# Patient Record
Sex: FEMALE | Race: WHITE | URBAN - METROPOLITAN AREA
[De-identification: names, ages, dates, MRNs, and addresses within clinical notes are randomized per-mention and may not be internally consistent; named-entity substitution may affect disease eponyms.]

---

## 2023-01-13 ENCOUNTER — OFFICE VISIT (OUTPATIENT)
Dept: FAMILY MEDICINE CLINIC | Facility: CLINIC | Age: 28
End: 2023-01-13

## 2023-01-13 VITALS
WEIGHT: 131.81 LBS | DIASTOLIC BLOOD PRESSURE: 71 MMHG | BODY MASS INDEX: 19.98 KG/M2 | HEART RATE: 90 BPM | HEIGHT: 68.2 IN | SYSTOLIC BLOOD PRESSURE: 112 MMHG

## 2023-01-13 DIAGNOSIS — D22.9 CHANGE IN SKIN MOLE: Primary | ICD-10-CM

## 2023-01-13 PROCEDURE — 3008F BODY MASS INDEX DOCD: CPT | Performed by: FAMILY MEDICINE

## 2023-01-13 PROCEDURE — 3078F DIAST BP <80 MM HG: CPT | Performed by: FAMILY MEDICINE

## 2023-01-13 PROCEDURE — 3074F SYST BP LT 130 MM HG: CPT | Performed by: FAMILY MEDICINE

## 2023-01-13 PROCEDURE — 99203 OFFICE O/P NEW LOW 30 MIN: CPT | Performed by: FAMILY MEDICINE

## 2025-02-20 NOTE — PROGRESS NOTES
[facility]  Obstetrics and Gynecology  History & Physical    CC: Patient is a new patient and here for a well woman exam     Subjective:     HPI: Hannah Wu is a 29 year old  female here for a well women exam. Patient reports no issues, currently on OCPs, but will start trying for second pregnancy after completion of last pill pack this month. Advised to start PNV when actively trying to conceive. Pt also desires Rx for iron supplementation as she was anemic with last pregnancy and has been feeling increased fatigue for past 1 year.     OB History:  OB History    Para Term  AB Living   1 1 1 0 0 1   SAB IAB Ectopic Multiple Live Births   0 0 0 0 1       Gyne History:  Menarche: [age]  Period Cycle (Days): 28-30 days  Period Duration (Days): 4-5 days  Period Flow: moderate  Use of Birth Control (if yes, specify type): OCP  Hx Prior Abnormal Pap: No  Pap Result Notes: Pt believes last pap was in  - wnl  Patient's last menstrual period was 2025 (exact date).      no history of STDs (non-HPV).   Sexual history: Active? Yes,   Birth control? OCPs    Meds:  Medications Ordered Prior to Encounter[1]    All:  Allergies[2]    PMH:  Past Medical History:    Scoliosis       Immunization History:   Immunization History   Administered Date(s) Administered    Covid-19 Vaccine Pfizer 30 mcg/0.3 ml 2022    Influenza 10/26/2021    Influenza Vaccine, trivalent (IIV3), 0.5mL IM 2022    TDAP 2020       PSH:  Past Surgical History:   Procedure Laterality Date      2024    [place] teeth removed         Social History:  Social History     Socioeconomic History    Marital status:      Spouse name: Not on file    Number of children: Not on file    Years of education: Not on file    Highest education level: Not on file   Occupational History    Not on file   Tobacco Use    Smoking status: Never    Smokeless tobacco: Never   Vaping Use     Vaping status: Never Used   Substance and Sexual Activity    Alcohol use: Yes     Comment: socially    Drug use: Never    Sexual activity: Yes     Partners: Male     Birth control/protection: OCP   Other Topics Concern     Service Not Asked    Blood Transfusions Not Asked    Caffeine Concern Yes    Occupational Exposure Not Asked    Hobby Hazards Not Asked    Sleep Concern Not Asked    Stress Concern Not Asked    Weight Concern Not Asked    Special Diet Not Asked    Back Care Not Asked    Exercise Yes     Comment: walking    Bike Helmet Not Asked    Seat Belt Yes    Self-Exams Not Asked   Social History Narrative    Not on file     Social Drivers of Health     Food Insecurity: Low Risk  (1/7/2024)    Received from [facility]    Food Insecurity     Have there been times that your food ran out, and you didn't have money to get more?: No     Are there times that you worry that this might happen?: No   Transportation Needs: Low Risk  (1/7/2024)    Received from [facility]    Transportation Needs     Do you have trouble getting transportation to medical appointments?: No     How do you normally get to and from your appointments?: Not on file   Stress: Not on file   Housing Stability: Not on file (1/7/2024)         Patient feels unsafe or threatened?: no    Abuse: no physical, sexual or mental.     Family History:  Family History   Problem Relation Age of Onset    No Known Problems Mother     Lipids Father     Skin cancer Father         no chemo/radiation    No Known Problems Maternal Grandmother     Colon Cancer Maternal Grandfather     Other (lung cancer) Paternal Grandmother     Pancreatic Cancer Paternal Grandfather     Breast Cancer Paternal Aunt 40    Uterine Cancer Neg     Ovarian Cancer Neg        Health maintenance:  Mammogram (age 40 and q1-2yr): n/a  Colonoscopy (age 45 and q10yr): n/a    Review of Systems:  General:  denies fevers, chills, fatigue and  malaise  Breast:  denies rashes, skin changes, pain, lumps or discharge   Respiratory:  denies SOB, dyspnea, cough or wheezing  Cardiovascular:  denies chest pain, palpitations  GI: denies abdominal pain, diarrhea, constipation  :  denies dysuria, hematuria, increased urinary frequency.   Heme:  denies history of excessive bleeding or bruising      Objective:     Vitals:    25 1258   BP: 114/80   Pulse: 76   Weight: 141 lb 4 oz (64.1 kg)   Height: 68\"         Body mass index is 21.48 kg/m².    General: AAO.NAD.   CVS exam: normal peripheral perfusion  Chest: non-labored breathing, no tachypnea   Breast:  symmetric, no dominant or suspicious mass, no skin or nipple changes and no axillary adenopathy  Abdominal exam:  soft, nontender, nondistended  Pelvic exam:   VULVA: normal appearing vulva with no masses, tenderness or lesions  PERINEUM:  normal appearing, no lesions   URETHRAL MEATUS:  normal appearing, no lesions   VAGINA: normal appearing vagina with normal color and discharge, no lesions  CERVIX: normal appearing cervix without discharge or lesions  UTERUS: uterus is normal size, shape, consistency and nontender  ADNEXA: normal adnexa in size, nontender and no masses  PERIRECTAL:  normal appearing, no lesions   Ext: non-tender, no edema    Assessment:     Hannah Wu is a 29 year old  female here for a well women exam.     Patient Active Problem List   Diagnosis   (none) - all problems resolved or deleted         Plan:     Problem List Items Addressed This Visit    None  Visit Diagnoses       Well woman exam with routine gynecological exam    -  Primary    Relevant Medications    SRONYX 0.1-20 MG-MCG Oral Tab    Ferrous Sulfate 325 (65 Fe) MG Oral Tab    Cervical cancer screening        Relevant Medications    SRONYX 0.1-20 MG-MCG Oral Tab    Ferrous Sulfate 325 (65 Fe) MG Oral Tab    Other Relevant Orders    ThinPrep PAP Smear    Hpv Dna  High Risk , Thin Prep Collect               Cervical cancer screening  - discussion held with the patient about ASCCP guidelines  - repeat pap smear collected today; pt declined STI testing  Health maintenance  - encouraged to maintain weight at healthy BMI  - discussed importance of exercise and healthy eating  - self breast exam instructions provided  Anemia history  - Rx for iron supplement sent to patient's pharmacy      All of the findings and plan were discussed with the patient.  She notes understanding and agrees with the plan of care.  All questions were answered to the best of my ability at this time.      RTC in 1 year for a well woman exam or sooner if needed     Sil Almaraz,   [facility] - OBGYN      Discussed with patient that there will not be further notification of normal or benign results other than receiving results on Lexara. A Lexara message or telephone call will be placed by the physician and/or office staff if results are abnormal.     Note to patient and family   The 21st Century Cures Act makes medical notes available to patients in the interest of transparency.  However, please be advised that this is a medical document.  It is intended as [id number] communication.  It is written and medical language may contain abbreviations or verbiage that are technical and unfamiliar.  It may appear blunt or direct.  Medical documents are intended to carry relevant information, facts as evident, and the clinical opinion of the practitioner.        This note could include assistance by Dragon voice recognition. Errors in content may be related to improper recognition by the system; efforts to review and correct have been done but errors may still exist.        [1]   Current Outpatient Medications on File Prior to Visit   Medication Sig Dispense Refill    SRONYX 0.1-20 MG-MCG Oral Tab Take 1 tablet by mouth daily.      Multiple Vitamin (MULTIVITAMIN ADULT OR) Take by mouth.       No current facility-administered medications on file  prior to visit.   [2] No Known Allergies

## 2025-05-30 NOTE — TELEPHONE ENCOUNTER
Patient calling to initiate prenatal care  LMP 5/3  Patient is 7-8 weeks on 6/21-  Confirmation of pregnancy appointment scheduled on   Future Appointments   Date Time Provider Department Center   6/25/2025 12:15 PM [facility] [address]   6/25/2025  1:00 PM Steven Knox MD [facility] [address]   7/24/2025  2:30 PM Lynda Turner APN [facility] [address]   8/20/2025  1:00 PM Halley Mesa MD [facility] [address]       Insurance [employer]    Any history of ectopic pregnancy? NO  Any history of miscarriage? NO   Any medications that you are taking on a regular basis other than prenatal vitamins? No  (if not taking prenatal vitamins, encourage patient to start taking.)  Any bleeding since the first day of last LMP and your positive pregnancy test? No

## 2025-06-25 NOTE — PATIENT INSTRUCTIONS
Medications Safe in Pregnancy    The following over-the-counter medications may be taken safely after 12 weeks gestation by any patient who is pregnant.  Please follow the instructions on the package for adult dosage.  If you experience any symptoms prior to 12 weeks, please call the office at [phone].  (**these are all safe for you to use now. We ask that you use them sparingly in the first trimester and that you call us with any persistent concerns**)     Colds/Congestion: Flonase, Saline Nasal Spray, Neti Pot, Plain Robitussin, Robitussin DM, Mucinex DM, Vicks 44 E, Vicks Vapor rub, Cough drops without Zinc or Sudafed.   Contact your doctor if you have a persistent fever over 100.4, shortness of breath, coughing up green mucus, or a sore throat that persists from more than 3 days     Diarrhea: Imodium, Maalox Anti-Diarrheal or Kaopectate  Contact the office if you have diarrhea for more than 3 days.     Allergies: Benadryl, Claritin or Zyrtec     Hemorrhoids: Tucks pads, Preparation H, Annusol, Sitz bath or Witch hazel  Eat a high fiber diet and drink plenty of fluids.     Yeast: Monistat 3 or 7  Call if your symptoms do not improve, or if you experience vaginal bleeding, or a watery discharge.     Constipation: Metamucil, Colace, fiber supplement, Milk of Magnesia or Dulcolax  Drink plenty of fluids, eat high-fiber foods and take the above laxatives sporadically.      Headache or Mild aches and pain: Extra Strength Tylenol      Gas: Gas X, Gelusil, Papaya Tablets, Maalox, Mylicon or Mylanta Gas     Heartburn: Tums, Mylanta, Pepcid AC or Maalox     Sore throat: Alcohol free Chloraseptic spray or Lozenges      Nausea and Vomiting: ½ tablet Unisom plus 100mg of Vitamin B6 at bedtime (may increase B6 up to 200mg per day), Joy root tea or raspberry leaf tea     Insomnia: Tylenol PM, Vitamin B6 50mg, warm bath or milk, Unisom, Nytol or Sominex.      We have listed a few medications for common symptoms seen  in pregnancy.  Please contact the office if you are unsure about any over the counter medications that are not listed above.       Nausea and vomiting management in pregnancy     1. Half tablet of Unisom and 100 mg of Vitamin B6 at bedtime. Can repeat Vitamin B6 x1 in the morning or during the day  2. Small, frequent meals/snacks- especially important to include good proteins in your evening meal if you are feeling nauseated in the morning. This will keep your stomach full longer and reduce nausea.  3. Ginger tea, ginger ale, ginger chews, peppermint candies, hard candies  4. Keep a snack and or drink at your bedside to have before you even get out of bed in the morning  5. Sea Bands ([email or web address]/) these can be worn all day/night as needed. The correct spot to wear them is 3 finger widths down from where your wrist bends on the inside     **if you find yourself unable to keep anything down for 24 hours or more, please go to one of the [facility] or the ER so that you can get IV fluids and medications**  Healthy Eating Habits During Pregnancy  It’s important to develop healthy eating habits while you are pregnant, for you as well as for your baby. Here are some ways to stay healthy.   Aim for a healthy weight  A slow, steady rate of weight gain is often best. After the first trimester, you may gain about a pound a week. If you were overweight before pregnancy, you need to gain fewer pounds. Your healthcare provider can give you a healthy weight goal for your pregnancy.   Don’t diet  Now is not the time to diet. You may not get enough of the nutrients you and your baby need. Instead, learn how to be a healthy eater. Start by doing it for your baby. Soon, you may do it for yourself.   Vitamins and supplements  Talk with your healthcare provider about taking these and other prenatal vitamins and supplements.   Iron makes the extra blood you need now.  Calcium and vitamin D help build and  keep strong bones.  Folic acid helps prevent certain birth defects.  Iodine helps the thyroid work right.  Some vitamins may not be safe to take. Your healthcare provider will tell you which ones to avoid.  Fluids  Drink at least 8 to 10 cups of fluid daily. Your baby needs fluids. Fluids also decrease constipation, flush out toxins and waste, limit swelling, and help prevent bladder infections. Water is best. Other good choices are:   Water or seltzer water with a slice of lemon or lime. (These can also help ease an upset stomach.)  Clear soups that are low in salt  Low-fat or fat-free milk, soy or rice milk with calcium added  Popsicles or gelatin  Things to avoid  Some things might harm your growing baby. Don’t eat or drink:   Alcohol  Unpasteurized dairy foods and juices  Raw or undercooked meat, poultry, fish, or eggs  Unwashed fruits and vegetables  Prepared meats, like deli meats or hot dogs, unless heated until steaming hot  Fish that are high in mercury, like shark, swordfish, vikas mackerel, tilefish, and albacore tuna  Things to limit  Ask your healthcare provider whether it’s safe to eat or drink:   Caffeine  Artificial sweeteners  Organ meats  Certain types of fish  Fish and shellfish that contain mercury in lower amounts, like shrimp, canned light tuna, salmon, pollock, and catfish      Foods to Avoid During Pregnancy  Deli Meats- You may eat deli meats from the deli.  If you eat deli meats in the package, it may be contaminated with Listeria. Heat all deli meats in a package to 165 degrees Fahrenheit before eating, even if the label states the meat is “pre-cooked”.    Soft Cheeses and Unpasteurized Products - You may eat soft cheeses that are pasteurized.  Please avoid all soft cheeses, milk or juice that is unpasteurized.  Fish- avoid fish that contains a high level of mercury. These include but are not limited to; [place], Orange Roughy, Tile Fish, Shark, Swordfish, and Vikas Mackerel. Please see chart  below for good fish choices in pregnancy. Also avoid any raw, uncooked shellfish such as oysters, clams, or sushi.  Make sure to cook all meats; including ground beef, pork, and poultry to their desired temperatures before consuming. This reduces the chance of a food borne illness.  Caffeine- limit to 200 mg or an 8oz cup daily or less.   Alcohol- no amount of alcohol is determined to be safe in pregnancy. Do not drink any alcoholic beverages while pregnant.            [facility]- Prenatal Testing    The following information is designed to help you understand some of the optional tests that are available to you to screen for problems in your pregnancy.  Before considering any of these tests, you will need to consider the following questions:    [1] What would you do if an abnormality were detected?  If the answer is nothing, then you may decide to decline all testing.  [2] Would you be willing to undergo testing which might increase your risk of miscarriage, such as an amniocentesis or CVS (see below)?  [3] If you have the initial testing, and it indicates that there might be a problem, and you subsequently decide not to do invasive testing such as an amniocentesis, would you worry excessively through the remainder of the pregnancy?    The following tests are available to screen for problems in your pregnancy:    [1]  First Trimester Screening (also called Nuchal Thickness, Nuchal Translucency or NT)- This is an abdominal ultrasound done between 10 and 14 weeks by a perinatology specialist (Maternal Fetal Medicine, MFM) which measures the thickness of the skin behind your baby's neck.  Increased thickness of the skin in this area has been linked to an increased risk of genetic abnormalities like Down Syndrome.  A second visit may be required if the baby is not in the correct position.  You will need to schedule an appointment with the Maternal Fetal Medicine office.  Address and phone number  below:  Please verify insurance coverage:  CPT code: 72533 (ydoer) or 87268 (twins)  Diagnosis: Genetic screening- Z36.8A  Maternal Fetal Medicine  Dr. Truong, Dr. Ramirez, Dr. Reis, and Dr. Mccray  [address] [place] [address] [address] [place] [address]  Phone [phone]  Fax [phone]    [2] Cell-Free DNA testing (NIPT)- This is a blood test done any time after 10-11 weeks which screens for genetic abnormalities like Down Syndrome.  It is greater than 98% sensitive but requires an amniocentesis for confirmation if abnormal.  It can also tell you the sex of the baby.  Please see the included pamphlet for more information about cost and billing options.  CPT code: 64074  Diagnosis code: Genetic screening- 36.8A    [3] Quad Screen (also called AFP-Plus or Tetra Screen)- This is a blood test done which screens for both genetic abnormalities like Down Syndrome and neural tube defects (NTD's), such as spina bifida (an abnormal opening in the spine which can cause paralysis).  It is usually performed around 16-20 weeks.  If the Quad screen comes back abnormal, it does not mean that your baby definitely has an abnormality.  It only means that there is an increased risk of an abnormality.  Additional testing such as a Level II ultrasound or amniocentesis may be recommended (see below).  This test is less accurate at picking up genetic abnormalities than the cell-free DNA test.  If you have test #1 or 2, then usually only the AFP part of the Quad screen would be performed to screen for NTD's (see below).    [4]  Alpha Fetoprotein (AFP, MSAFP)- This is a simple blood test that screens for neural tube defects such as spina bifida (an abnormal opening in the spine).  It is usually performed around 16-20 weeks.  Additional testing such as a Level II ultrasound may be recommended for an abnormal test result.  Please verify insurance coverage:  CPT code: 78900 Diagnosis code: Genetic Screening- Z36.8A    [5] Cystic Fibrosis/SMA  Carrier/Fragile X Carrier Screening- Cystic Fibrosis is a genetic disease which causes lung problems in the infant.  SMA (spinal muscular atrophy) is a genetic disease that affects the nerve cells of the spinal cord.  Fragile X is the most common cause of mental disabilities.  These genetic defects would need to be passed from both parents to the child.  A blood test is performed on the mother, and if her test is positive, then the father should also be tested. These tests can be done at any time in the pregnancy, usually in the first trimester with your initial OB labs.  All babies are screened for cystic fibrosis after birth.  Please verify insurance coverage:  Cystic Fibrosis CPT Code: 62592 Diagnosis code: Cystic Fibrosis screening- Z13.228  SMA CPT Code: 49877 Diagnosis code: Genetic Screening- Z36.8A or Testing for Genetic Disease Carrier- Z13.71    [6]  Level II Ultrasound-  This is an abdominal ultrasound done at approximately 20-21 weeks by a perinatology specialist (DEBBIE) at [facility] which looks at many of the baby's internal structures.  Abnormalities in certain structures have been associated with an increased risk of genetic abnormalities.  It also screens for NTD's.  This ultrasound would replace the Level I Ultrasound that we normally perform at our office around the same time.    [7]  Amniocentesis-  During this procedure, a needle is passed through your abdominal wall to withdrawal some amniotic fluid from around the baby.  It screens for both genetic abnormalities and NTD's and is usually performed around 15-16 weeks.  This test has the highest accuracy for detecting genetic abnormalities, but there may be a small risk of miscarriage or other complications.  A similar procedure called Chorionic Villus Sampling (CVS) is performed by passing a catheter through the vagina to remove a small sample of tissue from the placenta (afterbirth).  CVS may have a higher risk of miscarriage and other rare  complications compared to amniocentesis but can be performed earlier in pregnancy.  Amniocentesis is often recommended/offered if any of the previously mentioned tests come back abnormal.  A pamphlet is available if you would like more information about this option.  If you decide to have an amniocentesis, the other tests would be unnecessary.      The above information covers some of the basics.  Pamphlets on the Cell-Free DNA test, Quad Screen and Cystic Fibrosis test should be in your prenatal packet.  Your doctor will discuss this information in more detail, and feel free to ask additional questions.  Also, remember that all of these tests are optional, and will only be performed at your request.  Testing that needs to be done through the perinatologist's office may require 2-3 weeks lead time to schedule.              PRENATAL INSTRUCTIONS    These prenatal instructions are specific to the care of our patients and supplement the books and pamphlets you are receiving.  The physicians and office staff are also available to answer questions not covered in this material.  We function as a group practice with physicians alternating call for deliveries.  For this reason you should see each of the doctors at least once during your pregnancy.  We provide continuous coverage of our patients and are committed to competent and concerned patient care.    PRENATAL CARE  Because of the importance of good prenatal care, you will have monthly office visits until the 28th week of pregnancy, then every two weeks for the next eight weeks and weekly during the final month.  Certain conditions may require more frequent visits.    MEDICATION  You will be given a prescription for prenatal vitamins with DHA to be taken until your six week postpartum visit or until you stop breastfeeding, whichever lasts longer.  These vitamins can occasionally exacerbate stomach irritation or constipation;  please contact us if you are having  difficulties tolerating your vitamin.  Should you become anemic during your pregnancy, you may receive a supplemental iron tablet to correct the condition.  Unnecessary medication should be avoided, but you may take Tylenol (Acetaminophen, regular or extra-strength) for ordinary headaches and Tums, Mylanta or Maalox for heartburn.  For cough or cold symptoms you may try Contac, Benadryl, Tylenol Sinus, Chlor-Trimeton, Actifed, Sudafed or plain Robitussin.  For constipation drink 6 to 8 glasses of water each day and increase your roughage intake with foods such as lettuce, bran, wheat bread and green vegetables.  You may take Metamucil, Colace or Senekot if the above do not work.  Please call the office to confirm the safety of any other prescription, over-the-counter or herbal medications.\Alcohol intake and tobacco should be avoided during pregnancy.  Caffeinated beverages (coffee, tea and soft drinks) should be limited to no more than 2 cups daily.  Pregnant women should consume the following each day through diet or supplements:  o Folic acid 400-800 micrograms   o Iron 30 mg (or be screened for anemia)  o Vitamin D 600 international units  o Calcium 1,000 mg    NUTRITION/HEALTH  Gaining weight is the easiest thing for a pregnant woman to do.  A well balanced diet of lean meat, fish, poultry, non-starchy vegetables, fruit and salads should accomplish an average weight gain of 2 to 4 pounds a month.  Avoid raw or undercooked fish or meats.  If you have deli meat, please microwave it or cook on the stove until steaming.  Soft cheeses (Camembert, Blue, Roquefort, Goat), all non-pasteurized foods, and large game fish (Shark, Swordfish, Vikas Mackerel, Tilefish, [place]) should be avoided, because of concerns about mercury exposure.  Other fish including canned light tuna should be limited to 2 servings per week.  Albacore (“white”) tuna should be limited to once per week.  Avoid cigarette smoking, alcohol, drugs, and  vaping.  If you have deli meat, please microwave it or cook on the stove until steaming.  Caffeine: Low-to-moderate caffeine intake in pregnancy does not appear to be associated with any adverse outcomes. Pregnant women may have caffeine but should probably limit it to less than 300 mg/d (a typical 8-ounce cup of brewed coffee has approximately 130 mg of caffeine. An 8-ounce cup of tea or 12-ounce soda has approximately 50 mg of caffeine), but exact amounts vary based on the specific beverage or food.  Avoid hot tub use.  Hair dye is presumed safe, but there are no randomized trials.  Insect Repellants: Topical insect repellants (including DEET) can be used in pregnancy and should be used in areas with high risk for insect-borne illnesses.  Recommended weight gain goals:   Pre-pregnancy BMI     Recommended Weight Gain   Underweight < 18.5     28-40 lbs   Normal 18.5 - 24.9     25-35 lbs   Overweight 25 - 29.9     15-25 lbs   Obese 30 or greater     11-20 lbs    PHYSICAL ACTIVITY  We encourage our patients to continue physical activities and exercise during their pregnancy.  We recommend activities such as swimming, stationary biking, walking, low-impact aerobics, tennis, and golf.  Other activities such as diving, bicycle riding, water/snow-skiing, horseback riding and flying in small aircraft should be avoided.  If you choose to exercise, your heart rate should not increase to more than 140 beats per minute or 70% of your maximum heart rate.  We recommend that you exercise for no more than 30 minutes at one time.  Saunas, Jacuzzis and tanning beds should be avoided.  Sexual intercourse is permitted in the absence of bleeding,  labor or any other complications of pregnancy.    IF YOU ARE INVOLVED IN A  MINOR CAR ACCIDENT OR HAVE A MINOR FALL, please contact our doctor on call at [phone].  Your phone call will transfer to the answering service if the office is closed.    [facility]  sponsors regular childbirth classes and we strongly recommend a class for both expectant parents.  Other popular classes include Breastfeeding, Infant CPR and Infant Care.  For more information go to [email or web address]--> Services-->Pregnancy and baby-->Tour, Classes and Events.      LABOR (at term, 37 weeks or more)  Weekdays, please call the Office at [phone].  At night and on weekends/holidays, please call Labor & Delivery at [phone].  If you have any of the following symptoms: contractions 5 to 6 minutes apart for one hour, or sooner if you have a history of fast labors, broken bag of water with or without contractions (gush or persistent slow leakage of fluid), any vaginal bleeding other than a slight bloody show following an internal exam or light blood-tinged mucous discharge, you should go directly to Labor & Delivery at [facility].  The Labor & Delivery staff will contact the doctor on call and notify him or her of your labor status. AVOID HEAVY MEALS IF YOU SUSPECT THE ONSET OF LABOR     LABOR (less than 37 weeks)  Please call the office at [phone] right away if you experience contractions more frequently than every 10 to 15 minutes for more than an hour, or any unusual low back pain or pelvic pressure.  Also use this number if you need to speak to the physician about any urgent medical problems after business hours.    HOSPITAL  The delivery of your baby will take place at [facility], located at [address] [place] [address].      PEDIATRICIAN  You will need to choose a pediatrician or family practitioner to take care of your baby both while you are in the hospital and after you go home.  If the physician you choose is not on staff at [facility], you will also need to select a physician to see the baby while you are in the hospital.  Please make sure that any physician you choose accepts your insurance plan and is accepting new patients.  Feel free to ask us for a  recommendation if you are not familiar with the doctors in this area.    DELIVERY  There are three types of anesthetics available for use during labor: short acting narcotics (Stadol, Nubain) given through your IV, local anesthetics injected to numb your perineum during delivery, and the epidural injection to numb you from the waist down during labor and delivery. The above choices are individualized, and we will work with you to make your labor experience as comfortable as possible.  Communication is extremely important during this process, so we encourage you to ask questions and discuss any concerns.  We believe childbirth is a natural process, and we hope to make your experience as wonderful as possible!   NAUSEA AND VOMITING IN PREGNANCY    This pamphlet contains some useful information if you are experiencing troublesome morning (or all day) sickness.  Morning sickness is the result of high hormone levels in pregnancy.  It usually starts around 5-7 weeks and starts to improve around 10-12 weeks.  Some suggestions on how to decrease your nausea are listed below, with simplest remedies listed first.    DIETARY SUGGESTIONS:  Every patient will have particular foods or drinks that really worsen the nausea.  Trial and error is the most important rule.  We recommend frequent, small meals.  Try eating nimo or saltine crackers, especially first thing in the morning.  Many patients find that spicy foods, greasy foods and foods with a strong odor are the worst.  To minimize odors, eat foods cold if possible and use a fan or open window in the kitchen (or avoid cooking altogether!).  Try drinking liquids ice cold in a plastic cup with a lid and straw.    HERBAL REMEDIES:  Seabands/relief bands/accupressure bands (www.reliefband.com)  Joy 250 mg three times daily  Vitamin B6 10-25 mg three to four times daily, maximum 200 mg daily    OVER-THE-COUNTER MEDICATIONS (use only one of these at a time):  Dramamine  mg  up to four times daily  Unisom 12.5 mg up to four times daily  Benadryl 25 mg three to four times daily (may cause fatigue)    PRENATAL VITAMINS:  Prenatal vitamins can worsen nausea, primarily because they contain iron.  If you find that the prenatal vitamin is making things worse, there are several things you can try.  First, ask us for a different brand of vitamin.  Patients who have problems with one brand of vitamin often find that a different brand works better.  Second, try taking the vitamin at a different time of day, or splitting it in half and taking half in the morning and half at night.  Third, there is a \"mini-vitamin\" called Premesis that contains only folate, calcium and vitamin B6, which you can take until the nausea subsides.  You can also try taking two Flintstones vitamins daily if all else fails.    If you are unable to keep anything down, including liquids, for more than 24-36 hours, please contact our office.  Keep in mind that the baby's caloric requirements early in pregnancy are fairly small, and babies do a good job of getting all the nutrients they need from your body stores.    Finally, if none of the above suggestions help or you are losing significant amounts of weight (usually more than 7-10 pounds), there are several prescription medications that may also help.  Feel free to call us if you think this may be necessary.    As a final bit of reassurance, some studies have shown that pregnancies with severe morning sickness often have a better than usual outcome... Probably because a healthy pregnancy produces lots of hormones... And thus lots of nausea!    GOOD LUCK!

## 2025-06-25 NOTE — PROGRESS NOTES
Subjective:  Patient presents complaining of no menses. Positive home pregnancy test.  LMP 5/3/25.  Previous pregnancy uncomplicated  section for arrest of dilation.  No hypertension,  labor or diabetes.  No family history of any inheritable diseases or congenital birth defects on either side of family.    Objective:  /60   Pulse 75   Ht 68\"   Wt 140 lb 6 oz (63.7 kg)   LMP 2025 (Approximate)     Physical Examination:  General appearance: Well dressed, well nourished in no apparent distress  Neurologic/Psychiatric: Alert and oriented to person, place and time, mood normal, affect appropriate    Summary of Ultrasound Findings:  Ultrasound scan performed transvaginally.  LMP 5/3/25  7w4day by LMP;  7w0day by ultrasound  Viable intrauterine pregnancy  Dates consistent with ultrasound.  Final EDC:  25 by LMP consistent with ultrasound on 2025  Normal ovaries bilaterally.  Left ovary with simple cyst.     Assessment/Plan:  Amenorrhea- Normal dating ultrasound  Previous CS- Plan RCS 39 wks.  Likely will decline BS  Follow up 3-4 weeks for new OB visit.  Prenatal vitamin with 0.4 mg folate, DHA.  Optional prenatal screening tests reviewed including cfdna, carrier screen/CF, NT/first trimester screen, Quad/AFP, Level 2 ultrasound, amniocentesis/CVS.  Bleeding precautions provided.    Diagnoses and all orders for this visit:    Amenorrhea  -     US PELVIS, ABDOMINAL GYNE EMG ONLY    Other orders  -     OB/GYNE ULTRASOUND SCAN  -     OB/GYNE ULTRASOUND REPORT  -     OB/GYNE ULTRASOUND REPORT         Return for New OB Visit.

## 2025-07-24 NOTE — PROGRESS NOTES
Here for initial prenatal visit with our group.  29 year old  at 11w5d by LMP    Patient's last menstrual period was 2025 (approximate)..     Last pap smear was in  and it was normal.     OB History    Para Term  AB Living   2 1 1   1   SAB IAB Ectopic Multiple Live Births       1      # Outcome Date GA Lbr [place]/2nd Weight Sex Type Anes PTL Lv   2 Current            1 Term 24 39w3d  8 lb 11.3 oz (3.95 kg) M CS-LTranv EPI N LISA       ROS:  Reviewed, all wnl    Please see prenatal physical exam tab for initial OB physical exam  US: please see US tab    Prenatal course and care discussed with patient: visits, labs, HIV, AFP, sonograms, GL, GBS, restrictions.  Pamphlets given. Genetic counseling done at prior visit regarding Cystic fibrosis and SMA carrier screen, First trimester screen/NT screen, CVS, QS + midtrimester sonogram for markers, amniocentesis. Pt desires NIPS.    A/P:  1. Supervision of other normal pregnancy, antepartum (HCC)  - Type and screen; Future  - CBC W Differential W Platelet  - Hepatitis B Surface Antigen  - T Pallidum Screening [place]  - Rubella, IGG  - HIV AG AB COMBO  - Urine Culture, Routine  - HCV Antibody    2. Encounter for  screening for other genetic defect (HCC)  - Kailyn Handy NIPS; Future    CRISTHIAN 4 weeks/ prn

## 2025-07-25 NOTE — TELEPHONE ENCOUNTER
SANDY 2/7/2027- currently 11w6d   Blood type: O-  Patient desires NIPT testing- would like gender reported.  Order placed for [facility].  Patient aware that [facility] will send her a link by text or email to schedule mobile phlebotomy and ship a kit to her home.  Advised to defer blood draw until after 12 weeks gestation to reduce the need for a repeat test.  Advised not to look at results on [facility] portal or in MyChart if she is not ready to see the gender.  Reminded patient to complete additional lab orders at any [facility] lab location.  Patient verbalized understanding.         Order Number: [id number]    none

## 2025-07-25 NOTE — TELEPHONE ENCOUNTER
----- Message from Lynda Turner sent at 7/24/2025  2:57 PM CDT -----  Patient would like the NIPS